# Patient Record
Sex: MALE | Race: WHITE | URBAN - METROPOLITAN AREA
[De-identification: names, ages, dates, MRNs, and addresses within clinical notes are randomized per-mention and may not be internally consistent; named-entity substitution may affect disease eponyms.]

---

## 2023-03-15 ENCOUNTER — OFFICE VISIT (OUTPATIENT)
Age: 43
End: 2023-03-15

## 2023-03-15 VITALS
OXYGEN SATURATION: 98 % | BODY MASS INDEX: 28.77 KG/M2 | HEIGHT: 70 IN | RESPIRATION RATE: 20 BRPM | WEIGHT: 201 LBS | HEART RATE: 76 BPM

## 2023-03-15 DIAGNOSIS — M25.511 ACUTE PAIN OF RIGHT SHOULDER: Primary | ICD-10-CM

## 2023-03-15 DIAGNOSIS — M75.101 ROTATOR CUFF SYNDROME, RIGHT: ICD-10-CM

## 2023-03-15 NOTE — PROGRESS NOTES
[facility] AMBULATORY OFFICE NOTE      Patient: Helene Patrick                MRN: [id number]       SSN: [id number]  YOB: 1980        AGE: 36 y.o. SEX: male  Body mass index is 28.84 kg/m². PCP: No primary care provider on file.  03/15/23    CHIEF COMPLAINT: Right shoulder pain 3/10    HPI: Helene Patrick is a 36 y.o. male patient who presents to the office today with greater than 1 week of right shoulder pain. He had something similar in the past that was treated with a corticosteroid injection and physical therapy exercises. Had an MRI in 2019. His shoulder was good up until a week ago or so when he was doing yard work he noticed increasing pain and decreased range of motion afterwards. That pain is gotten mostly better with ibuprofen and rest.  He still has some slight pain today. He does not report any other trauma or injury to the right shoulder either recently or in the past.    History reviewed. No pertinent past medical history. History reviewed. No pertinent family history. No current outpatient medications on file. No current facility-administered medications for this visit. Allergies   Allergen Reactions    Nickel        History reviewed. No pertinent surgical history.     Social History     Socioeconomic History    Marital status:      Spouse name: Not on file    Number of children: Not on file    Years of education: Not on file    Highest education level: Not on file   Occupational History    Not on file   Tobacco Use    Smoking status: Not on file    Smokeless tobacco: Not on file   Substance and Sexual Activity    Alcohol use: Not on file    Drug use: Not on file    Sexual activity: Not on file   Other Topics Concern    Not on file   Social History Narrative    Not on file     Social Determinants of Health     Financial Resource Strain: Not on file   Food Insecurity: Not on file   Transportation Needs: Not on file   Physical Activity: Not on file   Stress: Not on file   Social Connections: Not on file   Intimate Partner Violence: Not on file   Housing Stability: Not on file       REVIEW OF SYSTEMS:    14 point review of systems on the intake form is negative except as noted in the HPI    PHYSICAL EXAMINATION:  Pulse 76   Resp 20   Ht 5' 10\" (1.778 m)   Wt 201 lb (91.2 kg)   SpO2 98%   BMI 28.84 kg/m²   Body mass index is 28.84 kg/m².    GENERAL: Alert and oriented x3, in no acute distress, well-developed, well-nourished.  HEENT: Normocephalic, atraumatic.    Shoulder Examination     R   L  ROM   FF  Full   Full  ER  Full   Full   IR  Full   Full  Rotator Cuff Pain   Supra  +   -   Infra  -   -   Subscap -   -  Crepitus  -   -  Effusion  -   -  Warmth  -   -   Erythema  -   -  Instability  -   -  AC Joint TTP  -   -  Clavicle   Deformity -   -   TTP  -   -  Proximal Humerus   Deformity -   -   TTP  -   -  Deltoid Strength 5   5  Biceps Strength 5   5  Biceps Deformity -   -  Biceps Groove Pain -   -  Impingement Sign +   -     IMAGING:  Imaging read by myself and interpreted as follows:  X-rays 4 views of the right shoulder were taken in the office today.  These do not show any significant acute or chronic bony abnormalities.    An MRI arthrogram report from 2019 was also reviewed which does not show any full-thickness rotator cuff tearing, biceps tearing, or labral injury.    ASSESSMENT & PLAN  Diagnosis: Right shoulder rotator cuff bursitis, impingement    Herbert likely had a flareup of some underlying right rotator cuff bursitis.  I recommended that he continue with conservative treatment for this.  I would watch this for now.  If his symptoms worsen or do not fully resolve I would recommend a corticosteroid injection and if that is unsuccessful alleviating his pain and new MRI.  I will see him back as needed.    Prescription medication management discussed.     Electronically signed by: Anson Cortés MD    Note:  This note was completed using voice recognition software.   Any typographical/name errors or mistakes are unintentional.